# Patient Record
Sex: FEMALE | Race: WHITE | Employment: UNEMPLOYED | URBAN - METROPOLITAN AREA
[De-identification: names, ages, dates, MRNs, and addresses within clinical notes are randomized per-mention and may not be internally consistent; named-entity substitution may affect disease eponyms.]

---

## 2017-02-15 RX ORDER — TEMAZEPAM 30 MG/1
CAPSULE ORAL
Qty: 30 CAP | Refills: 1 | Status: SHIPPED | OUTPATIENT
Start: 2017-02-15 | End: 2017-03-20 | Stop reason: ALTCHOICE

## 2017-02-15 NOTE — TELEPHONE ENCOUNTER
Unable to contact Pt. Phone if out of service.  Please notify Pt Rx is ready for pickup @[facility]

## 2017-03-20 RX ORDER — ESZOPICLONE 3 MG/1
TABLET, FILM COATED ORAL
Qty: 30 TAB | Refills: 0 | Status: SHIPPED | OUTPATIENT
Start: 2017-03-20 | End: 2017-04-28 | Stop reason: ALTCHOICE

## 2017-04-19 RX ORDER — TEMAZEPAM 30 MG/1
CAPSULE ORAL
Qty: 30 CAP | Refills: 1 | OUTPATIENT
Start: 2017-04-19

## 2017-04-28 ENCOUNTER — OFFICE VISIT (OUTPATIENT)
Dept: FAMILY MEDICINE CLINIC | Age: 37
End: 2017-04-28

## 2017-04-28 VITALS
WEIGHT: 171 LBS | BODY MASS INDEX: 29.19 KG/M2 | SYSTOLIC BLOOD PRESSURE: 130 MMHG | OXYGEN SATURATION: 96 % | RESPIRATION RATE: 16 BRPM | HEART RATE: 82 BPM | DIASTOLIC BLOOD PRESSURE: 79 MMHG | HEIGHT: 64 IN | TEMPERATURE: 97.6 F

## 2017-04-28 DIAGNOSIS — R10.33 PERIUMBILICAL ABDOMINAL PAIN: ICD-10-CM

## 2017-04-28 DIAGNOSIS — T14.8XXA BRUISING: ICD-10-CM

## 2017-04-28 DIAGNOSIS — G47.00 INSOMNIA, UNSPECIFIED TYPE: Primary | ICD-10-CM

## 2017-04-28 DIAGNOSIS — R01.1 NEWLY RECOGNIZED HEART MURMUR: ICD-10-CM

## 2017-04-28 DIAGNOSIS — R11.0 NAUSEA: ICD-10-CM

## 2017-04-28 RX ORDER — TEMAZEPAM 30 MG/1
CAPSULE ORAL
Refills: 0 | COMMUNITY
Start: 2017-03-22 | End: 2017-04-28 | Stop reason: SDUPTHER

## 2017-04-28 RX ORDER — TEMAZEPAM 30 MG/1
30 CAPSULE ORAL
Qty: 30 CAP | Refills: 2 | Status: SHIPPED | OUTPATIENT
Start: 2017-04-28 | End: 2017-07-24

## 2017-04-28 RX ORDER — OXYCODONE HYDROCHLORIDE 15 MG/1
15 TABLET ORAL
COMMUNITY
End: 2017-05-14

## 2017-04-28 RX ORDER — ONDANSETRON 8 MG/1
TABLET, ORALLY DISINTEGRATING ORAL
COMMUNITY
Start: 2017-04-04 | End: 2017-04-28 | Stop reason: SDUPTHER

## 2017-04-28 RX ORDER — ONDANSETRON 8 MG/1
8 TABLET, ORALLY DISINTEGRATING ORAL
Qty: 90 TAB | Refills: 2 | Status: SHIPPED | OUTPATIENT
Start: 2017-04-28

## 2017-04-28 RX ORDER — DIAZEPAM 5 MG/1
TABLET ORAL
COMMUNITY
Start: 2017-04-18 | End: 2017-07-24

## 2017-04-28 NOTE — MR AVS SNAPSHOT
Visit Information Date & Time Provider Department Dept. Phone Encounter #  
 4/28/2017 10:30 AM Jessi Arizmendi PA-C [facility] [phone] [id number] Upcoming Health Maintenance Date Due Pneumococcal 19-64 Medium Risk (1 of 1 - PPSV23) 2/11/1997 DTaP/Tdap/Td series (1 - Tdap) 2/11/1999 PAP AKA CERVICAL CYTOLOGY 2/11/1999 INFLUENZA AGE 9 TO ADULT 8/1/2016 Allergies as of 4/28/2017  Review Complete On: 4/28/2017 By: Jessi Arizmendi PA-C Severity Noted Reaction Type Reactions Ultram [Tramadol] High 06/24/2013    Anxiety, Other (comments) affects speach Amoxicillin Medium 05/27/2013    Shortness of Breath, Rash Aspirin  04/11/2016    Other (comments) H/O GASTRIC BYPASS Haldol [Haloperidol Lactate]  08/15/2016    Itching Nsaids (Non-steroidal Anti-inflammatory Drug)  06/24/2013    Other (comments) H/O GASTRIC BYPASS History of gastric bypass Current Immunizations  Never Reviewed No immunizations on file. Not reviewed this visit You Were Diagnosed With   
  
 Codes Comments Insomnia, unspecified type    -  Primary ICD-10-CM: G47.00 ICD-9-CM: 780.52 Nausea     ICD-10-CM: R11.0 ICD-9-CM: 787.02 Periumbilical abdominal pain     ICD-10-CM: R10.33 ICD-9-CM: 789.05 Bruising     ICD-10-CM: T14.8 ICD-9-CM: 924.9 Newly recognized heart murmur     ICD-10-CM: R01.1 ICD-9-CM: 175. 2 Vitals BP Pulse Temp Resp Height(growth percentile) Weight(growth percentile) 130/79 (BP 1 Location: Left arm, BP Patient Position: Sitting) 82 97.6 °F (36.4 °C) (Oral) 16 5' 4\" (1.626 m) 171 lb (77.6 kg) SpO2 BMI OB Status Smoking Status 96% 29.35 kg/m2 Hysterectomy Current Every Day Smoker BMI and BSA Data Body Mass Index Body Surface Area  
 29.35 kg/m 2 1.87 m 2 Preferred Pharmacy Pharmacy Name Phone [facility][id number]- [address], [address] [place] [address] [place] [phone] Your Updated Medication List  
  
   
This list is accurate as of: 4/28/17 10:40 AM.  Always use your most recent med list.  
  
  
  
  
 aluminum hydroxide 600 mg/5 mL suspension Commonly known as:  ALTERNGEL Take 5 mL by mouth three (3) times daily (with meals). amLODIPine 5 mg tablet Commonly known as:  NORVASC  
take 1 tablet by mouth once daily  
  
 diazePAM 5 mg tablet Commonly known as:  VALIUM  
  
 famotidine 20 mg tablet Commonly known as:  PEPCID Take 20 mg by mouth two (2) times a day. levothyroxine 150 mcg tablet Commonly known as:  SYNTHROID  
take 1 tablet by mouth once daily  
  
 lidocaine 2 % solution Commonly known as:  LIDOCAINE VISCOUS Take 15 mL by mouth as needed for Pain. NexIUM 40 mg capsule Generic drug:  esomeprazole Take 40 mg by mouth two (2) times a day. ondansetron 8 mg disintegrating tablet Commonly known as:  ZOFRAN ODT Take 1 Tab by mouth every eight (8) hours as needed for Nausea. oxyCODONE IR 15 mg immediate release tablet Commonly known as:  OXY-IR Take 15 mg by mouth every four (4) hours as needed for Pain. sucralfate 100 mg/mL suspension Commonly known as:  Tito Lazar Take 10 mL by mouth four (4) times daily. temazepam 30 mg capsule Commonly known as:  RESTORIL Take 1 Cap by mouth nightly. Max Daily Amount: 30 mg.  
  
  
  
  
Prescriptions Printed Refills  
 temazepam (RESTORIL) 30 mg capsule 2 Sig: Take 1 Cap by mouth nightly. Max Daily Amount: 30 mg.  
 Class: Print Route: Oral  
  
Prescriptions Sent to Pharmacy Refills  
 ondansetron (ZOFRAN ODT) 8 mg disintegrating tablet 2 Sig: Take 1 Tab by mouth every eight (8) hours as needed for Nausea. Class: Normal  
 Pharmacy: [facility], [address] [facility] Ph #: [phone] Route: Oral  
  
We Performed the Following REFERRAL TO CARDIOLOGY [PKO12 Custom] Comments:  
 Please evaluate patient for new heart murmur. To-Do List   
 04/28/2017 Imaging:  [facility] Referral Information Referral ID Referred By Referred To  
  
 [id number] JULIA MARTINEZ Not Available Visits Status Start Date End Date 1 New Request 4/28/17 4/28/18 If your referral has a status of pending review or denied, additional information will be sent to support the outcome of this decision. Referral ID Referred By Referred To  
 [id number] JULIA MARTINEZ MD  
   [address] [place] [address] [facility] Phone: [phone] Fax: [phone] Visits Status Start Date End Date 1 New Request 4/28/17 4/28/18 If your referral has a status of pending review or denied, additional information will be sent to support the outcome of this decision. Patient Instructions Abdominal Pain: Care Instructions Your Care Instructions Abdominal pain has many possible causes. Some aren't serious and get better on their own in a few days. Others need more testing and treatment. If your pain continues or gets worse, you need to be rechecked and may need more tests to find out what is wrong. You may need surgery to correct the problem. Don't ignore new symptoms, such as fever, nausea and vomiting, urination problems, pain that gets worse, and dizziness. These may be signs of a more serious problem. Your doctor may have recommended a follow-up visit in the next 8 to 12 hours. If you are not getting better, you may need more tests or treatment. The doctor has checked you carefully, but problems can develop later. If you notice any problems or new symptoms, get medical treatment right away. Follow-up care is a key part of your treatment and safety.  Be sure to make and go to all appointments, and call your doctor if you are having problems. It's also a good idea to know your test results and keep a list of the medicines you take. How can you care for yourself at home? · Rest until you feel better. · To prevent dehydration, drink plenty of fluids, enough so that your urine is light yellow or clear like water. Choose water and other caffeine-free clear liquids until you feel better. If you have kidney, heart, or liver disease and have to limit fluids, talk with your doctor before you increase the amount of fluids you drink. · If your stomach is upset, eat mild foods, such as rice, dry toast or crackers, bananas, and applesauce. Try eating several small meals instead of two or three large ones. · Wait until 48 hours after all symptoms have gone away before you have spicy foods, alcohol, and drinks that contain caffeine. · Do not eat foods that are high in fat. · Avoid anti-inflammatory medicines such as aspirin, ibuprofen (Advil, Motrin), and naproxen (Aleve). These can cause stomach upset. Talk to your doctor if you take daily aspirin for another health problem. When should you call for help? Call 911 anytime you think you may need emergency care. For example, call if: 
· You passed out (lost consciousness). · You pass maroon or very bloody stools. · You vomit blood or what looks like coffee grounds. · You have new, severe belly pain. Call your doctor now or seek immediate medical care if: 
· Your pain gets worse, especially if it becomes focused in one area of your belly. · You have a new or higher fever. · Your stools are black and look like tar, or they have streaks of blood. · You have unexpected vaginal bleeding. · You have symptoms of a urinary tract infection. These may include: 
¨ Pain when you urinate. ¨ Urinating more often than usual. 
¨ Blood in your urine. · You are dizzy or lightheaded, or you feel like you may faint. Watch closely for changes in your health, and be sure to contact your doctor if: · You are not getting better after 1 day (24 hours). Where can you learn more? Go to http://hieu-twin.info/. Enter O110 in the search box to learn more about \"Abdominal Pain: Care Instructions. \" Current as of: May 27, 2016 Content Version: 11.2 © 5512-3621 [employer]. Care instructions adapted under license by [employer] (which disclaims liability or warranty for this information). If you have questions about a medical condition or this instruction, always ask your healthcare professional. [address] any warranty or liability for your use of this information. Contusion: Care Instructions Your Care Instructions Contusion is the medical term for a bruise. It is the result of a direct blow or an impact, such as a fall. Contusions are common sports injuries. Most people think of a bruise as a black-and-blue spot. This happens when small blood vessels get torn and leak blood under the skin. But bones, muscles, and organs can also get bruised. This may damage deep tissues but not cause a bruise you can see. The doctor will do a physical exam to find the location of your contusion. You may also have tests to make sure you do not have a more serious injury, such as a broken bone or nerve damage. These may include X-rays or other imaging tests like a CT scan or MRI. Deep-tissue contusions may cause pain and swelling. But if there is no serious damage, they will often get better in a few weeks with home treatment. The doctor has checked you carefully, but problems can develop later. If you notice any problems or new symptoms, get medical treatment right away. Follow-up care is a key part of your treatment and safety. Be sure to make and go to all appointments, and call your doctor if you are having problems. It's also a good idea to know your test results and keep a list of the medicines you take. How can you care for yourself at home? · Put ice or a cold pack on the sore area for 10 to 20 minutes at a time to stop swelling. Put a thin cloth between the ice pack and your skin. · Be safe with medicines. Read and follow all instructions on the label. ¨ If the doctor gave you a prescription medicine for pain, take it as prescribed. ¨ If you are not taking a prescription pain medicine, ask your doctor if you can take an over-the-counter medicine. · If you can, prop up the sore area on pillows as much as possible for the next few days. Try to keep the sore area above the level of your heart. When should you call for help? Call your doctor now or seek immediate medical care if: 
· Your pain gets worse. · You have new or worse swelling. · You have tingling, weakness, or numbness in the area near the contusion. · The area near the contusion is cold or pale. Watch closely for changes in your health, and be sure to contact your doctor if: 
· You do not get better as expected. Where can you learn more? Go to http://hieu-twin.info/. Enter D993 in the search box to learn more about \"Contusion: Care Instructions. \" Current as of: May 27, 2016 Content Version: 11.2 © 9752-8922 [employer]. Care instructions adapted under license by [employer] (which disclaims liability or warranty for this information). If you have questions about a medical condition or this instruction, always ask your healthcare professional. [place] [address] any warranty or liability for your use of this information. Heart Murmur: Care Instructions Your Care Instructions A heart murmur is a blowing, whooshing, or rasping sound made by blood moving through the heart or the blood vessels near the heart. Murmurs can be heard through a stethoscope.  
Heart murmurs can occur during pregnancy or during a temporary illness, such as a fever. These murmurs usually are not a problem and go away on their own. However, sometimes a heart murmur is a sign of a serious problem, such as congenital heart disease or heart valve problems, that may need treatment. You may need more tests to check your heart. The treatment depends on the specific heart problem causing the murmur. Follow-up care is a key part of your treatment and safety. Be sure to make and go to all appointments, and call your doctor if you are having problems. It's also a good idea to know your test results and keep a list of the medicines you take. How can you care for yourself at home? · Take your medicines exactly as prescribed. Call your doctor if you think you are having a problem with your medicine. You will get more details on the specific medicines your doctor prescribes. · If your doctor recommends it, limit over-the-counter medicines that have stimulants in them. These include decongestants and cold and flu medicines. · If your doctor recommends it, get more exercise. Walking is a good choice. Bit by bit, increase the amount you walk every day. Try for 30 minutes on most days of the week. You also may want to swim, bike, or do other activities. · Do not smoke. Smoking increases your risk of heart attack and stroke. If you need help quitting, talk to your doctor about stop-smoking programs and medicines. These can increase your chances of quitting for good. · Limit alcohol to 2 drinks a day for men and 1 drink a day for women. Alcohol may interfere with some of your medicines. When should you call for help? Call 911 anytime you think you may need emergency care. For example, call if: 
· You have severe trouble breathing. · You cough up pink, foamy mucus and you have trouble breathing. · You passed out (lost consciousness). · You have a seizure. · You have symptoms of a stroke. These may include: ¨ Sudden numbness, tingling, weakness, or loss of movement in your face, arm, or leg, especially on only one side of your body. ¨ Sudden vision changes. ¨ Sudden trouble speaking. ¨ Sudden confusion or trouble understanding simple statements. ¨ Sudden problems with walking or balance. ¨ A sudden, severe headache that is different from past headaches. Call your doctor now or seek immediate medical care if: 
· You have new or increased shortness of breath. · You feel dizzy or lightheaded, or you feel like you may faint. · You gain 2 to 3 pounds or more over 2 days. · You have increased swelling in your legs or feet. · You have a fever. Watch closely for changes in your health, and be sure to contact your doctor if you have any problems. Where can you learn more? Go to http://hieu-twin.info/. Garret Lang in the search box to learn more about \"Heart Murmur: Care Instructions. \" Current as of: January 27, 2016 Content Version: 11.2 © 7013-5048 [employer]. Care instructions adapted under license by [employer] (which disclaims liability or warranty for this information). If you have questions about a medical condition or this instruction, always ask your healthcare professional. [place] [address] any warranty or liability for your use of this information. Introducing [facility]! Dear Cleo: Thank you for requesting a GO-SIM account. Our records indicate that you already have an active GO-SIM account. You can access your account anytime at [email or web address] Did you know that you can access your hospital and ER discharge instructions at any time in GO-SIM? You can also review all of your test results from your hospital stay or ER visit. Additional Information If you have questions, please visit the Frequently Asked Questions section of the Conspire website at [email or web address] Remember, Conspire is NOT to be used for urgent needs. For medical emergencies, dial 911. Now available from your iPhone and Android! Please provide this summary of care documentation to your next provider. Your primary care clinician is listed as Derik Marcus. If you have any questions after today's visit, please call [phone].

## 2017-04-28 NOTE — PATIENT INSTRUCTIONS
Abdominal Pain: Care Instructions  Your Care Instructions    Abdominal pain has many possible causes. Some aren't serious and get better on their own in a few days. Others need more testing and treatment. If your pain continues or gets worse, you need to be rechecked and may need more tests to find out what is wrong. You may need surgery to correct the problem. Don't ignore new symptoms, such as fever, nausea and vomiting, urination problems, pain that gets worse, and dizziness. These may be signs of a more serious problem. Your doctor may have recommended a follow-up visit in the next 8 to 12 hours. If you are not getting better, you may need more tests or treatment. The doctor has checked you carefully, but problems can develop later. If you notice any problems or new symptoms, get medical treatment right away. Follow-up care is a key part of your treatment and safety. Be sure to make and go to all appointments, and call your doctor if you are having problems. It's also a good idea to know your test results and keep a list of the medicines you take. How can you care for yourself at home? · Rest until you feel better. · To prevent dehydration, drink plenty of fluids, enough so that your urine is light yellow or clear like water. Choose water and other caffeine-free clear liquids until you feel better. If you have kidney, heart, or liver disease and have to limit fluids, talk with your doctor before you increase the amount of fluids you drink. · If your stomach is upset, eat mild foods, such as rice, dry toast or crackers, bananas, and applesauce. Try eating several small meals instead of two or three large ones. · Wait until 48 hours after all symptoms have gone away before you have spicy foods, alcohol, and drinks that contain caffeine. · Do not eat foods that are high in fat. · Avoid anti-inflammatory medicines such as aspirin, ibuprofen (Advil, Motrin), and naproxen (Aleve).  These can cause stomach upset. Talk to your doctor if you take daily aspirin for another health problem. When should you call for help? Call 911 anytime you think you may need emergency care. For example, call if:  · You passed out (lost consciousness). · You pass maroon or very bloody stools. · You vomit blood or what looks like coffee grounds. · You have new, severe belly pain. Call your doctor now or seek immediate medical care if:  · Your pain gets worse, especially if it becomes focused in one area of your belly. · You have a new or higher fever. · Your stools are black and look like tar, or they have streaks of blood. · You have unexpected vaginal bleeding. · You have symptoms of a urinary tract infection. These may include:  ¨ Pain when you urinate. ¨ Urinating more often than usual.  ¨ Blood in your urine. · You are dizzy or lightheaded, or you feel like you may faint. Watch closely for changes in your health, and be sure to contact your doctor if:  · You are not getting better after 1 day (24 hours). Where can you learn more? Go to http://hieuLast Sizetwin.info/. Enter H173 in the search box to learn more about \"Abdominal Pain: Care Instructions. \"  Current as of: May 27, 2016  Content Version: 11.2  © 4446-2369 [employer]. Care instructions adapted under license by [employer] (which disclaims liability or warranty for this information). If you have questions about a medical condition or this instruction, always ask your healthcare professional. [place] [address] any warranty or liability for your use of this information. Contusion: Care Instructions  Your Care Instructions  Contusion is the medical term for a bruise. It is the result of a direct blow or an impact, such as a fall. Contusions are common sports injuries. Most people think of a bruise as a black-and-blue spot.  This happens when small blood vessels get torn and leak blood under the skin. But bones, muscles, and organs can also get bruised. This may damage deep tissues but not cause a bruise you can see. The doctor will do a physical exam to find the location of your contusion. You may also have tests to make sure you do not have a more serious injury, such as a broken bone or nerve damage. These may include X-rays or other imaging tests like a CT scan or MRI. Deep-tissue contusions may cause pain and swelling. But if there is no serious damage, they will often get better in a few weeks with home treatment. The doctor has checked you carefully, but problems can develop later. If you notice any problems or new symptoms, get medical treatment right away. Follow-up care is a key part of your treatment and safety. Be sure to make and go to all appointments, and call your doctor if you are having problems. It's also a good idea to know your test results and keep a list of the medicines you take. How can you care for yourself at home? · Put ice or a cold pack on the sore area for 10 to 20 minutes at a time to stop swelling. Put a thin cloth between the ice pack and your skin. · Be safe with medicines. Read and follow all instructions on the label. ¨ If the doctor gave you a prescription medicine for pain, take it as prescribed. ¨ If you are not taking a prescription pain medicine, ask your doctor if you can take an over-the-counter medicine. · If you can, prop up the sore area on pillows as much as possible for the next few days. Try to keep the sore area above the level of your heart. When should you call for help? Call your doctor now or seek immediate medical care if:  · Your pain gets worse. · You have new or worse swelling. · You have tingling, weakness, or numbness in the area near the contusion. · The area near the contusion is cold or pale. Watch closely for changes in your health, and be sure to contact your doctor if:  · You do not get better as expected.   Where can you learn more? Go to http://hieu-twin.info/. Enter Z788 in the search box to learn more about \"Contusion: Care Instructions. \"  Current as of: May 27, 2016  Content Version: 11.2  © 3614-8679 [employer]. Care instructions adapted under license by [employer] (which disclaims liability or warranty for this information). If you have questions about a medical condition or this instruction, always ask your healthcare professional. [address] any warranty or liability for your use of this information. Heart Murmur: Care Instructions  Your Care Instructions    A heart murmur is a blowing, whooshing, or rasping sound made by blood moving through the heart or the blood vessels near the heart. Murmurs can be heard through a stethoscope. Heart murmurs can occur during pregnancy or during a temporary illness, such as a fever. These murmurs usually are not a problem and go away on their own. However, sometimes a heart murmur is a sign of a serious problem, such as congenital heart disease or heart valve problems, that may need treatment. You may need more tests to check your heart. The treatment depends on the specific heart problem causing the murmur. Follow-up care is a key part of your treatment and safety. Be sure to make and go to all appointments, and call your doctor if you are having problems. It's also a good idea to know your test results and keep a list of the medicines you take. How can you care for yourself at home? · Take your medicines exactly as prescribed. Call your doctor if you think you are having a problem with your medicine. You will get more details on the specific medicines your doctor prescribes. · If your doctor recommends it, limit over-the-counter medicines that have stimulants in them. These include decongestants and cold and flu medicines. · If your doctor recommends it, get more exercise. Walking is a good choice. Bit by bit, increase the amount you walk every day. Try for 30 minutes on most days of the week. You also may want to swim, bike, or do other activities. · Do not smoke. Smoking increases your risk of heart attack and stroke. If you need help quitting, talk to your doctor about stop-smoking programs and medicines. These can increase your chances of quitting for good. · Limit alcohol to 2 drinks a day for men and 1 drink a day for women. Alcohol may interfere with some of your medicines. When should you call for help? Call 911 anytime you think you may need emergency care. For example, call if:  · You have severe trouble breathing. · You cough up pink, foamy mucus and you have trouble breathing. · You passed out (lost consciousness). · You have a seizure. · You have symptoms of a stroke. These may include:  ¨ Sudden numbness, tingling, weakness, or loss of movement in your face, arm, or leg, especially on only one side of your body. ¨ Sudden vision changes. ¨ Sudden trouble speaking. ¨ Sudden confusion or trouble understanding simple statements. ¨ Sudden problems with walking or balance. ¨ A sudden, severe headache that is different from past headaches. Call your doctor now or seek immediate medical care if:  · You have new or increased shortness of breath. · You feel dizzy or lightheaded, or you feel like you may faint. · You gain 2 to 3 pounds or more over 2 days. · You have increased swelling in your legs or feet. · You have a fever. Watch closely for changes in your health, and be sure to contact your doctor if you have any problems. Where can you learn more? Go to http://hieu-twin.info/. Macy Carl in the search box to learn more about \"Heart Murmur: Care Instructions. \"  Current as of: January 27, 2016  Content Version: 11.2  © 1991-4315 [employer].  Care instructions adapted under license by [employer] (which disclaims liability or warranty for this information). If you have questions about a medical condition or this instruction, always ask your healthcare professional. [place] [address] any warranty or liability for your use of this information.

## 2017-04-28 NOTE — PROGRESS NOTES
Patient presents to the clinic for a medication refill. Patient would also like to discuss bruising on her abdomen.     Requested Prescriptions     Pending Prescriptions Disp Refills    ondansetron (ZOFRAN ODT) 8 mg disintegrating tablet      temazepam (RESTORIL) 30 mg capsule  0

## 2017-04-28 NOTE — PROGRESS NOTES
HISTORY OF PRESENT ILLNESS  Mandi Wadsworth is a 42 y.o. female. HPI Comments: Pt presents for refills of her restoril and zofran. She also has some concerns about bruising on her stomach that she noticed yesterday, with accompanying abdominal pain. She thinks she remembers something being said about a hernia, but isn't sure. Denies any recent trauma or abdominal strain. States she went to the ER last week d/t throwing up blood; was given a bolus of protonix, pain meds, benadryl, and nausea medication; as well as an injection to restore her blood sugar, which they indicated was low. She went to see her bypass surgeon on 4/25/17 about getting her gastric bypass reversed. They are planning an endoscopy and some other workup prior to setting the date for the reversal procedure. During the exam she mentioned that a recent prior visit to another facility she was told that she has a heart murmer. They evidently speculated that it might be related to her history of frequent blood transfusions. States she has never been told she had a murmur before. Medication Refill   Associated symptoms include abdominal pain. Pertinent negatives include no chest pain and no shortness of breath. Review of Systems   Constitutional: Negative for chills and fever. Respiratory: Negative for shortness of breath. Cardiovascular: Negative for chest pain. Gastrointestinal: Positive for abdominal pain and nausea. Psychiatric/Behavioral: The patient has insomnia. Visit Vitals    /79 (BP 1 Location: Left arm, BP Patient Position: Sitting)    Pulse 82    Temp 97.6 °F (36.4 °C) (Oral)    Resp 16    Ht 5' 4\" (1.626 m)    Wt 171 lb (77.6 kg)    SpO2 96%    BMI 29.35 kg/m2       Physical Exam   Constitutional: She is oriented to person, place, and time. Vital signs are normal. She appears well-developed and well-nourished. She is cooperative. She does not appear ill. No distress.    HENT:   Head: Normocephalic and atraumatic. Right Ear: External ear normal.   Left Ear: External ear normal.   Nose: Nose normal. No nasal deformity. Mouth/Throat: Uvula is midline, oropharynx is clear and moist and mucous membranes are normal. She has dentures. Eyes: Conjunctivae are normal.   Neck: Neck supple. Cardiovascular: Normal rate, regular rhythm, S1 normal and S2 normal.  Exam reveals no gallop and no friction rub. Murmur heard. Pulses:       Radial pulses are 2+ on the right side, and 2+ on the left side. 1-2 grade murmur appreciated both left and right of the sternum   Pulmonary/Chest: Effort normal and breath sounds normal. She has no decreased breath sounds. She has no wheezes. She has no rhonchi. She has no rales. Abdominal: There is tenderness in the periumbilical area. There is no rebound. Abdominal striae noted. No umbilical hernia appreciated. Small laparoscopy scar noted just superior to the umbilicus   Lymphadenopathy:     She has no cervical adenopathy. Neurological: She is alert and oriented to person, place, and time. Skin: Skin is warm and dry. Ecchymosis (see abdominal) noted. No rash noted. Psychiatric: She has a normal mood and affect. Her speech is normal and behavior is normal. Thought content normal.   Nursing note and vitals reviewed. ASSESSMENT and PLAN    ICD-10-CM ICD-9-CM    1. Insomnia, unspecified type G47.00 780.52 temazepam (RESTORIL) 30 mg capsule   2. Nausea R11.0 787.02 ondansetron (ZOFRAN ODT) 8 mg disintegrating tablet   3. Periumbilical abdominal pain R10.33 789.05 US ABD LTD   4. Bruising T14.8 924.9 US ABD LTD   5. Newly recognized heart murmur R01.1 785.2 REFERRAL TO CARDIOLOGY     1,2. Med refill.   3,4. Physical exam suggests injury, but pt denies trauma. Will follow ultrasound for possible breech in abdominal wall. Provided after-visit information on  Abdominal pain and contusion. 5. Provided after-visit information on  Heart murmur. Reviewed reasons to return or seek emergency care. Pt verbalized understanding and agreement with the plan of care.     Franco Bran PA-C

## 2017-05-19 RX ORDER — LEVOTHYROXINE SODIUM 150 UG/1
TABLET ORAL
Qty: 30 TAB | Refills: 0 | Status: SHIPPED | OUTPATIENT
Start: 2017-05-19 | End: 2017-06-08 | Stop reason: SDUPTHER

## 2017-06-03 DIAGNOSIS — I11.9 HYPERTENSIVE HEART DISEASE WITHOUT HEART FAILURE: ICD-10-CM

## 2017-06-04 RX ORDER — AMLODIPINE BESYLATE 5 MG/1
TABLET ORAL
Qty: 30 TAB | Refills: 0 | Status: SHIPPED | COMMUNITY
Start: 2017-06-04 | End: 2017-07-05 | Stop reason: SDUPTHER

## 2017-06-06 ENCOUNTER — DOCUMENTATION ONLY (OUTPATIENT)
Dept: FAMILY MEDICINE CLINIC | Age: 37
End: 2017-06-06

## 2017-06-06 NOTE — PROGRESS NOTES
Prescription for Lunesta 3mg prescribed to [id number] [facility] dated 03/20/2017 shredded due to no pt pick-up. Witnessed by Alayna Marcelino.

## 2017-07-05 DIAGNOSIS — I11.9 HYPERTENSIVE HEART DISEASE WITHOUT HEART FAILURE: ICD-10-CM

## 2017-07-05 RX ORDER — AMLODIPINE BESYLATE 5 MG/1
TABLET ORAL
Qty: 30 TAB | Refills: 5 | Status: SHIPPED | OUTPATIENT
Start: 2017-07-05

## 2017-07-24 ENCOUNTER — OFFICE VISIT (OUTPATIENT)
Dept: FAMILY MEDICINE CLINIC | Age: 37
End: 2017-07-24

## 2017-07-24 ENCOUNTER — TELEPHONE (OUTPATIENT)
Dept: FAMILY MEDICINE CLINIC | Age: 37
End: 2017-07-24

## 2017-07-24 VITALS
TEMPERATURE: 97.7 F | RESPIRATION RATE: 16 BRPM | WEIGHT: 169 LBS | BODY MASS INDEX: 28.16 KG/M2 | HEART RATE: 79 BPM | OXYGEN SATURATION: 98 % | SYSTOLIC BLOOD PRESSURE: 121 MMHG | HEIGHT: 65 IN | DIASTOLIC BLOOD PRESSURE: 82 MMHG

## 2017-07-24 DIAGNOSIS — I11.9 HYPERTENSIVE HEART DISEASE WITHOUT HEART FAILURE: ICD-10-CM

## 2017-07-24 DIAGNOSIS — F41.9 ANXIETY: Primary | ICD-10-CM

## 2017-07-24 DIAGNOSIS — G47.00 INSOMNIA, UNSPECIFIED TYPE: ICD-10-CM

## 2017-07-24 DIAGNOSIS — E03.9 ACQUIRED HYPOTHYROIDISM: ICD-10-CM

## 2017-07-24 RX ORDER — TRAZODONE HYDROCHLORIDE 50 MG/1
50 TABLET ORAL
Qty: 30 TAB | Refills: 2 | Status: SHIPPED | OUTPATIENT
Start: 2017-07-24 | End: 2017-07-24 | Stop reason: ALTCHOICE

## 2017-07-24 RX ORDER — ALPRAZOLAM 1 MG/1
1 TABLET ORAL
Qty: 30 TAB | Refills: 0 | Status: SHIPPED | OUTPATIENT
Start: 2017-07-24 | End: 2017-08-24 | Stop reason: SDUPTHER

## 2017-07-24 RX ORDER — LEVOTHYROXINE SODIUM 150 UG/1
TABLET ORAL
Qty: 30 TAB | Refills: 5 | Status: SHIPPED | OUTPATIENT
Start: 2017-07-24

## 2017-07-24 RX ORDER — ESZOPICLONE 3 MG/1
3 TABLET, FILM COATED ORAL
Qty: 30 TAB | Refills: 5 | Status: SHIPPED | OUTPATIENT
Start: 2017-07-24

## 2017-07-24 RX ORDER — ALPRAZOLAM 2 MG/1
TABLET ORAL
COMMUNITY
End: 2017-07-24 | Stop reason: DRUGHIGH

## 2017-07-24 NOTE — PROGRESS NOTES
HISTORY OF PRESENT ILLNESS  Niall Sahni is a 42 y.o. female. HPI Comments: Niall Carrillo is here for a couple of things. She would like something for sleep, the Restoril was causing nightmares. She also wants something for anxiety. Months ago she was taking Valium, but she says it wasn't helping. Niall Carrillo was all set to get her anastomotic ulcer fixed, but the surgeon in [place] left the practice and now they are trying to find her another surgeon. Surgeons around here will not operate until she quits smoking. She was recently in the hospital for a couple of days because of hematemesis. She also needs a refill of her Synthroid. Hospital Follow Up   Associated symptoms include abdominal pain. Pertinent negatives include no chest pain, no headaches and no shortness of breath. Medication Refill   Associated symptoms include abdominal pain. Pertinent negatives include no chest pain, no headaches and no shortness of breath. Review of Systems   Constitutional: Negative for chills and fever. Eyes: Negative for blurred vision and double vision. Respiratory: Negative for cough and shortness of breath. Cardiovascular: Negative for chest pain and palpitations. Gastrointestinal: Positive for abdominal pain. Negative for nausea and vomiting. Neurological: Negative for headaches. Psychiatric/Behavioral: Negative for suicidal ideas. The patient is nervous/anxious and has insomnia. Physical Exam   Constitutional: Vital signs are normal. She appears well-developed and well-nourished. HENT:   Right Ear: Tympanic membrane and ear canal normal.   Left Ear: Tympanic membrane and ear canal normal.   Nose: Nose normal.   Mouth/Throat: Uvula is midline, oropharynx is clear and moist and mucous membranes are normal.   Eyes: Pupils are equal, round, and reactive to light. Cardiovascular: Normal rate and regular rhythm.     Pulmonary/Chest: Effort normal and breath sounds normal.   Abdominal: There is tenderness (epigastrium). Skin: No rash noted. Nursing note and vitals reviewed. ASSESSMENT and PLAN    ICD-10-CM ICD-9-CM    1. Anxiety F41.9 300.00 REFERRAL TO PSYCHIATRY   2. Insomnia, unspecified type G47.00 780.52    3. Hypertensive heart disease without heart failure I11.9 402.90    4.  Acquired hypothyroidism E03.9 244.9

## 2017-07-24 NOTE — PATIENT INSTRUCTIONS
A one time prescription for xanax until you can get into psych-I've referred you to someone in [place]. I don't write for Xanax or Valium chronically. For the sleep, start Desyrel 50 mg nightly, 1 hour before bedtime. Recheck as needed. Monitor BP.

## 2017-07-24 NOTE — MR AVS SNAPSHOT
Visit Information Date & Time Provider Department Dept. Phone Encounter #  
 7/24/2017  2:30 PM Janine De La Cruz, [address] [facility] [phone] [id number] Follow-up Instructions Return if symptoms worsen or fail to improve. Upcoming Health Maintenance Date Due Pneumococcal 19-64 Medium Risk (1 of 1 - PPSV23) 2/11/1997 DTaP/Tdap/Td series (1 - Tdap) 2/11/1999 PAP AKA CERVICAL CYTOLOGY 2/11/1999 INFLUENZA AGE 9 TO ADULT 8/1/2017 Allergies as of 7/24/2017  Review Complete On: 7/24/2017 By: Janine De La Cruz MD  
  
 Severity Noted Reaction Type Reactions Ultram [Tramadol] High 06/24/2013    Anxiety, Other (comments) affects speach Amoxicillin Medium 05/27/2013    Shortness of Breath, Rash Aspirin  04/11/2016    Other (comments) H/O GASTRIC BYPASS Haldol [Haloperidol Lactate]  08/15/2016    Itching Nsaids (Non-steroidal Anti-inflammatory Drug)  06/24/2013    Other (comments) H/O GASTRIC BYPASS History of gastric bypass Current Immunizations  Never Reviewed No immunizations on file. Not reviewed this visit You Were Diagnosed With   
  
 Codes Comments Anxiety    -  Primary ICD-10-CM: F41.9 ICD-9-CM: 300.00 Insomnia, unspecified type     ICD-10-CM: G47.00 ICD-9-CM: 780.52 Hypertensive heart disease without heart failure     ICD-10-CM: I11.9 ICD-9-CM: 402.90 Acquired hypothyroidism     ICD-10-CM: E03.9 ICD-9-CM: 911. 9 Vitals BP Pulse Temp Resp Height(growth percentile) Weight(growth percentile) 121/82 (BP 1 Location: Left arm, BP Patient Position: Sitting) 79 97.7 °F (36.5 °C) (Oral) 16 5' 5\" (1.651 m) 169 lb (76.7 kg) SpO2 BMI OB Status Smoking Status 98% 28.12 kg/m2 Hysterectomy Current Every Day Smoker BMI and BSA Data Body Mass Index Body Surface Area  
 28.12 kg/m 2 1.88 m 2 Preferred Pharmacy Pharmacy Name Phone [id number]- [place], [address] [place] [address] [place] [phone] Your Updated Medication List  
  
   
This list is accurate as of: 7/24/17  2:48 PM.  Always use your most recent med list.  
  
  
  
  
 ALPRAZolam 1 mg tablet Commonly known as:  Don  Take 1 Tab by mouth daily as needed for Anxiety. aluminum hydroxide 600 mg/5 mL suspension Commonly known as:  ALTERNGEL Take 5 mL by mouth three (3) times daily (with meals). amLODIPine 5 mg tablet Commonly known as:  Mitzy Chimes TAKE ONE TABLET BY MOUTH DAILY  
  
 famotidine 20 mg tablet Commonly known as:  PEPCID Take 20 mg by mouth two (2) times a day. levothyroxine 150 mcg tablet Commonly known as:  SYNTHROID  
TAKE ONE TABLET BY MOUTH DAILY  
  
 lidocaine 2 % solution Commonly known as:  LIDOCAINE VISCOUS Take 15 mL by mouth as needed for Pain. ondansetron 8 mg disintegrating tablet Commonly known as:  ZOFRAN ODT Take 1 Tab by mouth every eight (8) hours as needed for Nausea. PriLOSEC 20 mg capsule Generic drug:  omeprazole Take 20 mg by mouth daily. sucralfate 100 mg/mL suspension Commonly known as:  Deloria Area Take 10 mL by mouth four (4) times daily. traZODone 50 mg tablet Commonly known as:  Jo Laughlin Take 1 Tab by mouth nightly. Prescriptions Printed Refills ALPRAZolam (XANAX) 1 mg tablet 0 Sig: Take 1 Tab by mouth daily as needed for Anxiety. Class: Print Route: Oral  
  
Prescriptions Sent to Pharmacy Refills  
 levothyroxine (SYNTHROID) 150 mcg tablet 5 Sig: TAKE ONE TABLET BY MOUTH DAILY Class: Normal  
 Pharmacy: [facility]/pharmacy #[id number] Ph #: [phone]  
 traZODone (DESYREL) 50 mg tablet 2 Sig: Take 1 Tab by mouth nightly. Class: Normal  
 Pharmacy: [address] [facility] Ph #: [phone] Route: Oral  
  
We Performed the Following REFERRAL TO PSYCHIATRY [REF91 Custom] Comments:  
 Please evaluate patient for anxiety. Follow-up Instructions Return if symptoms worsen or fail to improve. Referral Information Referral ID Referred By Referred To  
  
 [id number] [facility] [address] [place] [address], MD   
   [address] [place], [place] [address]Min Phone: [phone] Fax: [phone] Visits Status Start Date End Date 1 New Request 7/24/17 7/24/18 If your referral has a status of pending review or denied, additional information will be sent to support the outcome of this decision. Patient Instructions A one time prescription for xanax until you can get into psych-I've referred you to someone in [place]. I don't write for Xanax or Valium chronically. For the sleep, start Desyrel 50 mg nightly, 1 hour before bedtime. Recheck as needed. Monitor BP. Introducing \[facility]\"" & HEALTH SERVICES! Dear Christiana Coburn: Thank you for requesting a [employer] account. Our records indicate that you already have an active [employer] account. You can access your account anytime at [email or web address] Did you know that you can access your hospital and ER discharge instructions at any time in [employer]Computing? You can also review all of your test results from your hospital stay or ER visit. Additional Information If you have questions, please visit the Frequently Asked Questions section of the [employer]Comput[employer] website at [email or web address] Remember, NComputing is NOT to be used for urgent needs. For medical emergencies, dial 911. Now available from your iPhone and Android! Please provide this summary of care documentation to your next provider. Your primary care clinician is listed as Derik Marcus. If you have any questions after today's visit, please call [phone].

## 2017-07-24 NOTE — PROGRESS NOTES
Patient presents to the clinic for a hospital follow and medication refill. Patient would also like to discuss changing her sleep medication and blood pressure medication.

## 2017-07-28 ENCOUNTER — DOCUMENTATION ONLY (OUTPATIENT)
Dept: FAMILY MEDICINE CLINIC | Age: 37
End: 2017-07-28

## 2017-07-28 NOTE — PROGRESS NOTES
Prescription for eszopiclone (Lunesta) 3 mg prescribed to [address] [facility] [place] dated 07/24/2017 shredded due to prescription called into the pharmacy. Witnessed by [employer].

## 2017-08-07 RX ORDER — ALPRAZOLAM 1 MG/1
1 TABLET ORAL
Qty: 30 TAB | Refills: 0 | OUTPATIENT
Start: 2017-08-07

## 2017-08-07 NOTE — TELEPHONE ENCOUNTER
Requested Prescriptions     Pending Prescriptions Disp Refills    ALPRAZolam (XANAX) 1 mg tablet 30 Tab 0     Sig: Take 1 Tab by mouth daily as needed for Anxiety. Pt stated she hasnt heard from Psych yet, so I gave her the number to the office of Belinda Baut[facility]. She stated she will call them to schedule, but she will need a refill in the interim until she gets her appt scheduled.

## 2017-08-23 ENCOUNTER — HOSPITAL ENCOUNTER (EMERGENCY)
Age: 37
Discharge: HOME | End: 2017-08-23
Attending: EMERGENCY MEDICINE
Payer: MEDICAID

## 2017-08-23 VITALS
HEART RATE: 83 BPM | HEIGHT: 65 IN | OXYGEN SATURATION: 100 % | BODY MASS INDEX: 26.66 KG/M2 | RESPIRATION RATE: 16 BRPM | TEMPERATURE: 98.2 F | SYSTOLIC BLOOD PRESSURE: 150 MMHG | DIASTOLIC BLOOD PRESSURE: 100 MMHG | WEIGHT: 160 LBS

## 2017-08-23 DIAGNOSIS — B02.9 HERPES ZOSTER WITHOUT COMPLICATION: Primary | ICD-10-CM

## 2017-08-23 DIAGNOSIS — R21 RASH: ICD-10-CM

## 2017-08-23 DIAGNOSIS — R03.0 ELEVATED BLOOD PRESSURE READING: ICD-10-CM

## 2017-08-23 PROCEDURE — 99283 EMERGENCY DEPT VISIT LOW MDM: CPT

## 2017-08-23 PROCEDURE — 74011250637 HC RX REV CODE- 250/637: Performed by: NURSE PRACTITIONER

## 2017-08-23 RX ORDER — HYDROCODONE BITARTRATE AND ACETAMINOPHEN 5; 325 MG/1; MG/1
1 TABLET ORAL
Qty: 20 TAB | Refills: 0 | Status: SHIPPED | OUTPATIENT
Start: 2017-08-23

## 2017-08-23 RX ORDER — OXYCODONE AND ACETAMINOPHEN 5; 325 MG/1; MG/1
1 TABLET ORAL
Status: COMPLETED | OUTPATIENT
Start: 2017-08-23 | End: 2017-08-23

## 2017-08-23 RX ORDER — ACYCLOVIR 200 MG/1
800 CAPSULE ORAL
Status: COMPLETED | OUTPATIENT
Start: 2017-08-23 | End: 2017-08-23

## 2017-08-23 RX ORDER — ACYCLOVIR 800 MG/1
800 TABLET ORAL
Qty: 50 TAB | Refills: 0 | Status: SHIPPED | OUTPATIENT
Start: 2017-08-23 | End: 2017-09-02

## 2017-08-23 RX ADMIN — ACYCLOVIR 800 MG: 200 CAPSULE ORAL at 10:29

## 2017-08-23 RX ADMIN — OXYCODONE HYDROCHLORIDE AND ACETAMINOPHEN 1 TABLET: 5; 325 TABLET ORAL at 10:23

## 2017-08-23 NOTE — DISCHARGE INSTRUCTIONS
Shingles: Care Instructions  Your Care Instructions    Shingles (herpes zoster) causes pain and a blistered rash. The rash can appear anywhere on the body but will be on only one side of the body, the left or right. It will be in a band, a strip, or a small area. The pain can be very severe. Shingles can also cause tingling or itching in the area of the rash. The blisters scab over after a few days and heal in 2 to 4 weeks. Medicines can help you feel better and may help prevent more serious problems caused by shingles. Shingles is caused by the same virus that causes chickenpox. When you have chickenpox, the virus gets into your nerve roots and stays there (becomes dormant) long after you get over the chickenpox. If the virus becomes active again, it can cause shingles. Follow-up care is a key part of your treatment and safety. Be sure to make and go to all appointments, and call your doctor if you are having problems. It's also a good idea to know your test results and keep a list of the medicines you take. How can you care for yourself at home? · Be safe with medicines. Take your medicines exactly as prescribed. Call your doctor if you think you are having a problem with your medicine. Antiviral medicine helps you get better faster. · Try not to scratch or pick at the blisters. They will crust over and fall off on their own if you leave them alone. · Put cool, wet cloths on the area to relieve pain and itching. You can also use calamine lotion. Try not to use so much lotion that it cakes and is hard to get off. · Put cornstarch or baking soda on the sores to help dry them out so they heal faster. · Do not use thick ointment, such as petroleum jelly, on the sores. This will keep them from drying and healing. · To help remove loose crusts, soak them in tap water. This can help decrease oozing, and dry and soothe the skin.   · Take an over-the-counter pain medicine, such as acetaminophen (Tylenol), ibuprofen (Advil, Motrin), or naproxen (Aleve). Read and follow all instructions on the label. · Avoid close contact with people until the blisters have healed. It is very important for you to avoid contact with anyone who has never had chickenpox or the chickenpox vaccine. Pregnant women, young babies, and anyone else who has a hard time fighting infection (such as someone with HIV, diabetes, or cancer) is especially at risk. When should you call for help? Call your doctor now or seek immediate medical care if:  · You have a new or higher fever. · You have a severe headache and a stiff neck. · You lose the ability to think clearly. · The rash spreads to your forehead, nose, eyes, or eyelids. · You have eye pain, or your vision gets worse. · You have new pain in your face, or you cannot move the muscles in your face. · Blisters spread to new parts of your body. Watch closely for changes in your health, and be sure to contact your doctor if:  · The rash has not healed after 2 to 4 weeks. · You still have pain after the rash has healed. Where can you learn more? Go to http://hieuL[facility]twin.info/. Octavia Can in the search box to learn more about \"Shingles: Care Instructions. \"  Current as of: March 3, 2017  Content Version: 11.3  © 1295-2254 [employer]. Care instructions adapted under license by [employer] (which disclaims liability or warranty for this information). If you have questions about a medical condition or this instruction, always ask your healthcare professional. [place] [address] any warranty or liability for your use of this information. Diagnosis:   1. Herpes zoster without complication    2. Rash      MyChart Activation    Thank you for requesting access to [employer]. Please follow the instructions below to securely access and download your online medical record.  [employer] allows you to send messages to your doctor, view your test results, renew your prescriptions, schedule appointments, and more. How Do I Sign Up? 1. In your internet browser, go to [email or web address]  2. Click on the First Time User? Click Here link in the Sign In box. You will be redirect to the New Member Sign Up page. 3. Enter your [employer] Access Code exactly as it appears below. You will not need to use this code after youve completed the sign-up process. If you do not sign up before the expiration date, you must request a new code. MyChart Access Code: Activation code not generated  Current [employer] Status: Active (This is the date your [employer] access code will )    4. Enter the last four digits of your Social Security Number (xxxx) and Date of Birth (mm/dd/yyyy) as indicated and click Submit. You will be taken to the next sign-up page. 5. Create a [employer] ID. This will be your [employer] login ID and cannot be changed, so think of one that is secure and easy to remember. 6. Create a [employer] password. You can change your password at any time. 7. Enter your Password Reset Question and Answer. This can be used at a later time if you forget your password. 8. Enter your e-mail address. You will receive e-mail notification when new information is available in [address] 9. Click Sign Up. You can now view and download portions of your medical record. 10. Click the Download Summary menu link to download a portable copy of your medical information. Additional Information    If you have questions, please visit the Frequently Asked Questions section of the [employer] website at [email or web address] Remember, [employer] is NOT to be used for urgent needs. For medical emergencies, dial 911. Keep rash clean and dry. Avoid contact with others. Good handwashing technique. Recommend washing hands thoroughly  before touching eyes and face. Follow up with your primary care docotor.

## 2017-08-23 NOTE — ED PROVIDER NOTES
HPI Comments: Ingris Hayes is a 42year old female who states the has a rash on her back and the back of her thigh. It has been there two days and hurts badly. Adds her  has just had shingles, and now she feels like she has it as well. Patient is a 42 y.o. female presenting with rash. The history is provided by the patient. History limited by: No communication barrier. Rash    This is a new problem. The current episode started 2 days ago. The problem has been gradually worsening. There has been no fever. Affected Location: Right lower back and right posterior thigh. The pain is moderate. The pain has been constant since onset. Associated symptoms include blisters. 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    Temporomandibular joint disorder     Tobacco use        Past Surgical History:   Procedure Laterality Date    HX CERVICAL FUSION      HX CHOLECYSTECTOMY      2002    HX GASTRIC BYPASS      3/10    HX HYSTERECTOMY      2002    HX HYSTERECTOMY Bilateral     HX LUMBAR FUSION      HX OTHER SURGICAL      trigger finger         Family History:   Problem Relation Age of Onset    Diabetes Mother     Heart Disease Father     Diabetes Sister     Diabetes Maternal Grandmother     Cancer Paternal Grandmother        Social History     Social History    Marital status: SINGLE     Spouse name: N/A    Number of children: N/A    Years of education: N/A     Occupational History    Not on file. Social History Main Topics    Smoking status: Current Every Day Smoker     Packs/day: 0.25     Years: 25.00     Types: Cigarettes    Smokeless tobacco: Never Used      Comment: 5 cigs a day    Alcohol use No    Drug use: No    Sexual activity: Yes     Partners: Female     Other Topics Concern    Not on file     Social History Narrative         ALLERGIES: Ultram [tramadol]; Amoxicillin; Aspirin; Haldol [haloperidol lactate]; and Nsaids (non-steroidal anti-inflammatory drug)    Review of Systems   Constitutional: Negative. HENT: Negative. Eyes: Negative. Respiratory: Negative. Cardiovascular: Negative. Gastrointestinal: Negative. Endocrine: Negative. Genitourinary: Negative. Musculoskeletal: Negative. Skin: Positive for rash. Allergic/Immunologic: Negative. Neurological: Negative. Hematological: Negative. Psychiatric/Behavioral: Negative. Vitals:    08/23/17 0952   BP: (!) 150/100   Pulse: 83   Resp: 16   Temp: 98.2 °F (36.8 °C)   SpO2: 100%   Weight: 72.6 kg (160 lb)   Height: 5' 5\" (1.651 m)            Physical Exam   Constitutional: She is oriented to person, place, and time. She appears well-developed and well-nourished. No distress. HENT:   Head: Normocephalic and atraumatic. Eyes: EOM are normal. Pupils are equal, round, and reactive to light. Neck: Normal range of motion. Neck supple. Cardiovascular: Normal rate, regular rhythm and normal heart sounds. Pulmonary/Chest: Effort normal and breath sounds normal. No respiratory distress. She has no wheezes. She has no rales. Abdominal: Soft. Bowel sounds are normal. There is no tenderness. Genitourinary:   Genitourinary Comments: NE   Musculoskeletal: Normal range of motion. Neurological: She is alert and oriented to person, place, and time. Skin: Skin is warm and dry.    There is an erythematous clustering rash with lesions that have erupted and scabbed on the right lower back and right posterior thigh. There is no liquid DC at the current time,. There is surrounding erythema. The rash is following the S1 Dermatome pattern - consistent with Zoster. Psychiatric: She has a normal mood and affect. Nursing note and vitals reviewed. MDM  Number of Diagnoses or Management Options  Elevated blood pressure reading:   Herpes zoster without complication:   Rash:   Diagnosis management comments: MDM:  Will offer the Pt pain medication and antiviral.  Recommended follow up with PCP. Yuri Meyer NP  10:23 AM    PROGRESS NOTE:  One or more blood pressure readings were noted elevated during the Pt's presentation in the emergency department this date. This abnormal reading has been cited in the Pt's diagnosis, and they have been encouraged to follow up with their primary care physician, or referred to a consultant for further evaluation and treatment. Yuri Meyer NP  10:31 AM          Risk of Complications, Morbidity, and/or Mortality  Presenting problems: moderate  Diagnostic procedures: moderate  Management options: moderate      ED Course       Procedures      Diagnosis:   1. Herpes zoster without complication    2. Rash          Disposition:   Discharged to Home. Follow-up Information     Follow up With Details Comments Jd Nunez MD Call today to arrange follow up appointment. [place] [id number]  [phone]            Patient's Medications   Start Taking    ACYCLOVIR (ZOVIRAX) 800 MG TABLET    Take 1 Tab by mouth five (5) times daily for 10 days. HYDROCODONE-ACETAMINOPHEN (NORCO) 5-325 MG PER TABLET    Take 1 Tab by mouth every four (4) hours as needed for Pain. Max Daily Amount: 6 Tabs. Continue Taking    ALPRAZOLAM (XANAX) 1 MG TABLET    Take 1 Tab by mouth daily as needed for Anxiety. ALUMINUM HYDROXIDE (ALTERNGEL) 600 MG/5 ML SUSPENSION    Take 5 mL by mouth three (3) times daily (with meals). AMLODIPINE (NORVASC) 5 MG TABLET    TAKE ONE TABLET BY MOUTH DAILY    ESZOPICLONE (LUNESTA) 3 MG TABLET    Take 1 Tab by mouth nightly. Max Daily Amount: 3 mg. FAMOTIDINE (PEPCID) 20 MG TABLET    Take 20 mg by mouth two (2) times a day. LEVOTHYROXINE (SYNTHROID) 150 MCG TABLET    TAKE ONE TABLET BY MOUTH DAILY    LIDOCAINE (LIDOCAINE VISCOUS) 2 % SOLUTION    Take 15 mL by mouth as needed for Pain. OMEPRAZOLE (PRILOSEC) 20 MG CAPSULE    Take 20 mg by mouth daily. ONDANSETRON (ZOFRAN ODT) 8 MG DISINTEGRATING TABLET    Take 1 Tab by mouth every eight (8) hours as needed for Nausea. SUCRALFATE (CARAFATE) 100 MG/ML SUSPENSION    Take 10 mL by mouth four (4) times daily. These Medications have changed    No medications on file   Stop Taking    No medications on file        Provider Attestation:    I was personally available for consultation in the emergency department. I have reviewed the chart prior to the patient being discharged and agree with the [facility], including the assessment, treatment plan, and disposition.      Claudy Patel MD

## 2017-08-23 NOTE — ED TRIAGE NOTES
\"I woke up  Yesterday morning with a rash to right thigh and back. \" Pt reports spouse just getting over shingles.

## 2017-08-24 RX ORDER — ALPRAZOLAM 1 MG/1
1 TABLET ORAL
Qty: 30 TAB | Refills: 0 | Status: SHIPPED | OUTPATIENT
Start: 2017-08-24

## 2017-08-24 NOTE — TELEPHONE ENCOUNTER
Requested Prescriptions     Pending Prescriptions Disp Refills    ALPRAZolam (XANAX) 1 mg tablet 30 Tab 0     Sig: Take 1 Tab by mouth daily as needed for Anxiety. Pt was referred to Dr. Stephani Kidd and he has relocated to a different building and will not be accepting new patients for one month. Waiting on a call back to reschedule. Pt has 1 pill remaining and would like to know if you can fill a script until she is able to get in to see Dr. Stephani Kidd?

## 2024-02-11 NOTE — ED NOTES
I have reviewed discharge instructions with the patient. The patient verbalized understanding. Patient NAD, VSS. Patient armband removed and shredded. Pt educated on discharge instructions. All questions & concerns addressed. Pt ambulates to exit with steady gait and all belongings.    No IV to d/c